# Patient Record
Sex: FEMALE | Race: WHITE | URBAN - METROPOLITAN AREA
[De-identification: names, ages, dates, MRNs, and addresses within clinical notes are randomized per-mention and may not be internally consistent; named-entity substitution may affect disease eponyms.]

---

## 2020-12-28 ENCOUNTER — OFFICE VISIT (OUTPATIENT)
Dept: ENT CLINIC | Age: 25
End: 2020-12-28
Payer: COMMERCIAL

## 2020-12-28 VITALS — TEMPERATURE: 96.9 F

## 2020-12-28 DIAGNOSIS — G43.809 OTHER MIGRAINE WITHOUT STATUS MIGRAINOSUS, NOT INTRACTABLE: ICD-10-CM

## 2020-12-28 DIAGNOSIS — J32.8 OTHER CHRONIC SINUSITIS: ICD-10-CM

## 2020-12-28 DIAGNOSIS — J31.2 CHRONIC PHARYNGITIS: Primary | ICD-10-CM

## 2020-12-28 PROCEDURE — 99203 OFFICE O/P NEW LOW 30 MIN: CPT | Performed by: OTOLARYNGOLOGY

## 2020-12-28 RX ORDER — AMOXICILLIN AND CLAVULANATE POTASSIUM 875; 125 MG/1; MG/1
1 TABLET, FILM COATED ORAL 2 TIMES DAILY
Qty: 20 TAB | Refills: 0 | Status: SHIPPED | OUTPATIENT
Start: 2020-12-28 | End: 2021-01-07

## 2020-12-28 NOTE — PROGRESS NOTES
Subjective:    Ina Drafts   22 y.o.   1995     HPI     Location - throat, glands, head    Quality - frequent sore throat, swollen glands, migraines    Severity -  moderate    Duration - about 3 months    Timing - frequent    Context - pt states urgent care visits for sore throat and swollen glands, tends to go away after a few days with conservative treatment; she was told it is not strep and just takes OTC meds; she also has had increase in migraines this year, was wondering if any food sensitivity    Modifying Features - none    Associated symptoms/signs - migraines      Review of Systems  Review of Systems   Constitutional: Negative for chills and fever. HENT: Positive for congestion and sore throat. Negative for ear pain, hearing loss, nosebleeds and tinnitus. Eyes: Negative for blurred vision and double vision. Respiratory: Negative for cough, sputum production and shortness of breath. Cardiovascular: Negative for chest pain and palpitations. Gastrointestinal: Negative for heartburn, nausea and vomiting. Musculoskeletal: Negative for joint pain and neck pain. Skin: Negative. Neurological: Positive for headaches. Negative for dizziness, speech change and weakness. Endo/Heme/Allergies: Negative for environmental allergies. Does not bruise/bleed easily. Psychiatric/Behavioral: Negative for memory loss. The patient does not have insomnia. History reviewed. No pertinent past medical history.   Past Surgical History:   Procedure Laterality Date    HX WISDOM TEETH EXTRACTION  2013      Family History   Problem Relation Age of Onset    No Known Problems Mother     No Known Problems Father     No Known Problems Sister     No Known Problems Brother     Diabetes Maternal Grandfather      Social History     Tobacco Use    Smoking status: Never Smoker    Smokeless tobacco: Never Used   Substance Use Topics    Alcohol use: Yes     Comment: occassionally      Prior to Admission medications    Medication Sig Start Date End Date Taking? Authorizing Provider   amoxicillin-clavulanate (AUGMENTIN) 875-125 mg per tablet Take 1 Tab by mouth two (2) times a day for 10 days. 12/28/20 1/7/21 Yes Esha Cordon MD        No Known Allergies      Objective:     Visit Vitals  Temp 96.9 °F (36.1 °C)        Physical Exam  Vitals signs reviewed. Constitutional:       General: She is awake. She is not in acute distress. Appearance: Normal appearance. She is well-groomed and normal weight. HENT:      Head: Normocephalic and atraumatic. Jaw: There is normal jaw occlusion. No trismus, tenderness or malocclusion. Salivary Glands: Right salivary gland is not diffusely enlarged or tender. Left salivary gland is not diffusely enlarged or tender. Right Ear: Hearing, ear canal and external ear normal. Tympanic membrane is scarred. Left Ear: Hearing, ear canal and external ear normal. Tympanic membrane is scarred. Ears:      Joyce exam findings: does not lateralize. Right Rinne: AC > BC. Left Rinne: AC > BC. Nose: Mucosal edema present. No nasal deformity or septal deviation. Right Nostril: No epistaxis. Left Nostril: No epistaxis. Right Turbinates: Enlarged. Not swollen or pale. Left Turbinates: Enlarged. Not swollen or pale. Right Sinus: No maxillary sinus tenderness or frontal sinus tenderness. Left Sinus: No maxillary sinus tenderness or frontal sinus tenderness. Mouth/Throat:      Lips: Pink. No lesions. Mouth: Mucous membranes are moist. No oral lesions. Dentition: Normal dentition. No dental caries. Tongue: No lesions. Palate: No mass and lesions. Pharynx: Oropharynx is clear. Uvula midline. No oropharyngeal exudate or posterior oropharyngeal erythema. Tonsils: No tonsillar exudate. 1+ on the right. 1+ on the left.       Comments: Purulent PND left posterior pharynx  Eyes:      General: Vision grossly intact. Extraocular Movements: Extraocular movements intact. Right eye: No nystagmus. Left eye: No nystagmus. Conjunctiva/sclera: Conjunctivae normal.      Pupils: Pupils are equal, round, and reactive to light. Neck:      Musculoskeletal: No edema or erythema. Thyroid: No thyroid mass, thyromegaly or thyroid tenderness. Trachea: Trachea and phonation normal. No tracheal tenderness or tracheal deviation. Cardiovascular:      Rate and Rhythm: Normal rate and regular rhythm. Pulmonary:      Effort: Pulmonary effort is normal. No respiratory distress. Breath sounds: No stridor. Musculoskeletal: Normal range of motion. General: No swelling or tenderness. Lymphadenopathy:      Cervical: No cervical adenopathy. Skin:     General: Skin is warm and dry. Findings: No lesion or rash. Neurological:      General: No focal deficit present. Mental Status: She is alert and oriented to person, place, and time. Mental status is at baseline. Cranial Nerves: Cranial nerves are intact. Coordination: Romberg sign negative. Gait: Gait is intact. Comments: Negative Hallpike   Psychiatric:         Mood and Affect: Mood normal.         Behavior: Behavior normal. Behavior is cooperative. Assessment/Plan:     Encounter Diagnoses   Name Primary?  Chronic pharyngitis Yes    Other chronic sinusitis     Other migraine without status migrainosus, not intractable      With her visible purulent PND and sore throat, headaches I suspect she has an untreated chronic sinus picture  Will do 10 days augmentin and f/u 4 weeks  Consider allergy testing    Orders Placed This Encounter    amoxicillin-clavulanate (AUGMENTIN) 875-125 mg per tablet     Follow-up and Dispositions    · Return in about 4 weeks (around 1/25/2021).

## 2021-01-26 ENCOUNTER — TELEPHONE (OUTPATIENT)
Dept: ENT CLINIC | Age: 26
End: 2021-01-26

## 2021-02-11 ENCOUNTER — OFFICE VISIT (OUTPATIENT)
Dept: FAMILY MEDICINE CLINIC | Age: 26
End: 2021-02-11
Payer: COMMERCIAL

## 2021-02-11 VITALS
SYSTOLIC BLOOD PRESSURE: 110 MMHG | HEART RATE: 99 BPM | DIASTOLIC BLOOD PRESSURE: 62 MMHG | OXYGEN SATURATION: 98 % | WEIGHT: 134.25 LBS | TEMPERATURE: 98 F | BODY MASS INDEX: 24.7 KG/M2 | HEIGHT: 62 IN

## 2021-02-11 DIAGNOSIS — R51.9 INTRACTABLE EPISODIC HEADACHE, UNSPECIFIED HEADACHE TYPE: ICD-10-CM

## 2021-02-11 DIAGNOSIS — E16.2 HYPOGLYCEMIA: Primary | ICD-10-CM

## 2021-02-11 PROBLEM — N39.9 UROLOGIC DISORDER: Status: RESOLVED | Noted: 2021-02-11 | Resolved: 2021-02-11

## 2021-02-11 PROBLEM — R31.9 HEMATURIA: Status: ACTIVE | Noted: 2021-02-11

## 2021-02-11 PROBLEM — N39.9 UROLOGIC DISORDER: Status: ACTIVE | Noted: 2021-02-11

## 2021-02-11 PROCEDURE — 99213 OFFICE O/P EST LOW 20 MIN: CPT | Performed by: NURSE PRACTITIONER

## 2021-02-11 NOTE — PROGRESS NOTES
Subjective  Chief Complaint   Patient presents with    Headache     x 6 months     HPI:  Onesimo Díaz is a 22 y.o. female. Patient presents with complaint of headaches for the past several months. Headaches have occurred 2-3 times since December. Headaches occur when hungry and are accompanied by dizziness, nausea, shaking, and weakness. Headaches only occur when she is working and hungry.   Evaluation to date: none  Treatment to date: resolves with food and Goodies powder   Relevant PMH: Diagnosed with migraines as a teen, treated with RX prn meds    Past Medical History:   Diagnosis Date    Anxiety     Recurrent urinary tract infection      Family History   Problem Relation Age of Onset    No Known Problems Mother     No Known Problems Father     No Known Problems Sister     No Known Problems Brother     Diabetes Maternal Grandfather      Social History     Socioeconomic History    Marital status:      Spouse name: Not on file    Number of children: Not on file    Years of education: Not on file    Highest education level: Not on file   Occupational History    Not on file   Social Needs    Financial resource strain: Not on file    Food insecurity     Worry: Not on file     Inability: Not on file    Transportation needs     Medical: Not on file     Non-medical: Not on file   Tobacco Use    Smoking status: Never Smoker    Smokeless tobacco: Never Used   Substance and Sexual Activity    Alcohol use: Yes     Comment: occassionally    Drug use: Never    Sexual activity: Not on file   Lifestyle    Physical activity     Days per week: Not on file     Minutes per session: Not on file    Stress: Not on file   Relationships    Social connections     Talks on phone: Not on file     Gets together: Not on file     Attends Shinto service: Not on file     Active member of club or organization: Not on file     Attends meetings of clubs or organizations: Not on file     Relationship status: Not on file    Intimate partner violence     Fear of current or ex partner: Not on file     Emotionally abused: Not on file     Physically abused: Not on file     Forced sexual activity: Not on file   Other Topics Concern    Not on file   Social History Narrative    Not on file     No current outpatient medications on file prior to visit. No current facility-administered medications on file prior to visit. No Known Allergies  ROS  See HPI for pertinent ROS. Objective  Physical Exam  Vitals signs and nursing note reviewed. Constitutional:       General: She is not in acute distress. Appearance: Normal appearance. She is normal weight. HENT:      Head: Normocephalic. Eyes:      Extraocular Movements: Extraocular movements intact. Pupils: Pupils are equal, round, and reactive to light. Cardiovascular:      Rate and Rhythm: Normal rate and regular rhythm. Heart sounds: Normal heart sounds. Pulmonary:      Effort: Pulmonary effort is normal.      Breath sounds: Normal breath sounds. Musculoskeletal: Normal range of motion. Right lower leg: No edema. Left lower leg: No edema. Skin:     General: Skin is warm and dry. Neurological:      Mental Status: She is alert and oriented to person, place, and time. Cranial Nerves: Cranial nerves are intact. Sensory: Sensation is intact. Motor: Motor function is intact. Coordination: Coordination is intact. Gait: Gait is intact. Psychiatric:         Mood and Affect: Mood normal.         Behavior: Behavior normal.          Assessment & Plan      ICD-10-CM ICD-9-CM    1. Hypoglycemia  E16.2 251.2    2. Intractable episodic headache, unspecified headache type  R51.9 784.0      Diagnoses and all orders for this visit:    1. Hypoglycemia  Diagnosis of hypoglycemia is made on history, there is no specific test to diagnose.  Eat 6 smaller meals versus 3 large meals throughout the day to maintain steady glucose levels. Eat mostly proteins. Carry snack (ie. peanut butter crackers) at all times to eat during hypoglycemic episodes. 2. Intractable episodic headache, unspecified headache type  Neuro exam is unremarkable in the office today. Likely due to hunger/hypoglycemia. Return to the office for increasing, worsening headaches especially if not accompanied by hunger.            Mackenzie Posadas NP

## 2021-02-11 NOTE — PATIENT INSTRUCTIONS
Hypoglycemia: Care Instructions Your Care Instructions Hypoglycemia means that your blood sugar is low and your body is not getting enough fuel. Some people get low blood sugar from not eating often enough. Some medicines to treat diabetes can cause low blood sugar. People who have had surgery on their stomachs or intestines may get hypoglycemia. Problems with the pancreas, kidneys, or liver also can cause low blood sugar. A snack or drink with sugar in it will raise your blood sugar and should ease your symptoms right away. Your doctor may recommend that you change or stop your medicines until you can get your blood sugar levels under control. In the long run, you may need to change your diet and eating habits so that you get enough fuel for your body throughout the day. Follow-up care is a key part of your treatment and safety. Be sure to make and go to all appointments, and call your doctor if you are having problems. It's also a good idea to know your test results and keep a list of the medicines you take. How can you care for yourself at home? · Know the early signs of low blood sugar. Signs include: 
? Nausea. ? Hunger. ? Feeling nervous, irritable, or shaky. ? Cold, clammy skin. ? Sweating (when you're not exercising). ? A fast heartbeat. 
? Numbness or tingling in fingertips or lips. · If you have early signs of low blood sugar, eat or drink a quick-sugar food. Examples are glucose tablets, table sugar, hard candy (such as Life Savers), fruit juice, and regular (not diet) soda. · Eat small, frequent meals so you don't get too hungry between meals. · Balance extra exercise with eating more. · Keep a written record of your low blood sugar episodes, including what and when you last ate. This helps you know what causes your blood sugar to drop. · Make sure family, friends, and coworkers know the symptoms of low blood sugar and know how to get your sugar level up. · Wear medical alert jewelry that lists your condition. You can buy this at most drugstores. When should you call for help? Call 911 anytime you think you may need emergency care. For example, call if: 
  · You passed out (lost consciousness).  
  · You are confused or cannot think clearly.  
  · Your blood sugar is very high or very low. Watch closely for changes in your health, and be sure to contact your doctor if: 
  · Your blood sugar stays outside the level your doctor set for you.  
  · You have any problems. Where can you learn more? Go to http://www.gray.com/ Enter R860 in the search box to learn more about \"Hypoglycemia: Care Instructions. \" Current as of: December 20, 2019               Content Version: 12.6 © 6477-8727 [employer]. Care instructions adapted under license by [employer] (which disclaims liability or warranty for this information). If you have questions about a medical condition or this instruction, always ask your healthcare professional. [address] any warranty or liability for your use of this information.

## 2021-07-28 ENCOUNTER — OFFICE VISIT (OUTPATIENT)
Dept: FAMILY MEDICINE CLINIC | Age: 26
End: 2021-07-28
Payer: COMMERCIAL

## 2021-07-28 VITALS
SYSTOLIC BLOOD PRESSURE: 100 MMHG | TEMPERATURE: 97.5 F | OXYGEN SATURATION: 98 % | HEIGHT: 62 IN | DIASTOLIC BLOOD PRESSURE: 84 MMHG | RESPIRATION RATE: 16 BRPM | HEART RATE: 108 BPM | BODY MASS INDEX: 25.21 KG/M2 | WEIGHT: 137 LBS

## 2021-07-28 DIAGNOSIS — R19.7 DIARRHEA OF PRESUMED INFECTIOUS ORIGIN: ICD-10-CM

## 2021-07-28 DIAGNOSIS — R00.0 TACHYCARDIA: ICD-10-CM

## 2021-07-28 DIAGNOSIS — R09.81 NASAL CONGESTION: Primary | ICD-10-CM

## 2021-07-28 DIAGNOSIS — J06.9 VIRAL UPPER RESPIRATORY TRACT INFECTION: ICD-10-CM

## 2021-07-28 DIAGNOSIS — R11.2 INTRACTABLE VOMITING WITH NAUSEA, UNSPECIFIED VOMITING TYPE: ICD-10-CM

## 2021-07-28 PROCEDURE — 99214 OFFICE O/P EST MOD 30 MIN: CPT | Performed by: NURSE PRACTITIONER

## 2021-07-28 NOTE — PROGRESS NOTES
Subjective  Chief Complaint   Patient presents with    Other     headache, vomitting, diarreah, sore throat, right ear hurts over a week, went to [facility] on saturday     HPI:  Emelina Wright is a 32 y.o. female. Patient presents with complaint of nasal congestion, headache,runny nose, sneezing, ear pressure/pain, sore throat, mild cough for the past week. Diarrhea and vomiting for the past 3 days. Vomiting is improving. Diarrhea occurs quickly after eating or drinking. Denies fever, chills, and body aches. Denies SOB, CP, palpitations, and wheezing. Her  had a mild respiratory illness with diarrhea the week prior to her symptoms, not COVID tested. 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 Evaluation to date: [facility] 7/25 and diagnosed with UTI, prescribed Cipro and advised to stop for negative culture. Treatment to date: Ibuprofen, Muxinex- both offer temporary improvement  Relevant PMH: No pertinent additional PMH.     Past Medical History:   Diagnosis Date    Anxiety     Recurrent urinary tract infection      Family History   Problem Relation Age of Onset    No Known Problems Mother     No Known Problems Father     No Known Problems Sister     No Known Problems Brother     Diabetes Maternal Grandfather      Social History     Socioeconomic History    Marital status:      Spouse name: Not on file    Number of children: Not on file    Years of education: Not on file    Highest education level: Not on file   Occupational History    Not on file   Tobacco Use    Smoking status: Never Smoker    Smokeless tobacco: Never Used   Vaping Use    Vaping Use: Never used   Substance and Sexual Activity    Alcohol use: Yes     Comment: occassionally    Drug use: Never    Sexual activity: Not on file   Other Topics Concern    Not on file   Social History Narrative    Not on file     Social Determinants of Health     Financial Resource Strain:     Difficulty of Paying Living Expenses:    Food Insecurity:     Worried About Running Out of Food in the Last Year:     [address] in the Last Year:    Transportation Needs:     Lack of Transportation (Medical):  Lack of Transportation (Non-Medical):    Physical Activity:     Days of Exercise per Week:     Minutes of Exercise per Session:    Stress:     Feeling of Stress :    Social Connections:     Frequency of Communication with Friends and Family:     Frequency of Social Gatherings with Friends and Family:     Attends Mormon Services:     Active Member of Clubs or Organizations:     Attends Club or Organization Meetings:     Marital Status:    Intimate Partner Violence:     Fear of Current or Ex-Partner:     Emotionally Abused:     Physically Abused:     Sexually Abused:      No current outpatient medications on file prior to visit. No current facility-administered medications on file prior to visit. No Known Allergies  ROS  See HPI for pertinent ROS. Objective  Visit Vitals  /84 (BP 1 Location: Left upper arm, BP Patient Position: Sitting)   Pulse (!) 108   Temp 97.5 °F (36.4 °C) (Temporal)   Resp 16   Ht 5' 2\" (1.575 m)   Wt 137 lb (62.1 kg)   SpO2 98%   BMI 25.06 kg/m²       Physical Exam  Vitals and nursing note reviewed. Constitutional:       General: She is not in acute distress. Appearance: Normal appearance. HENT:      Head: Normocephalic. Eyes:      Extraocular Movements: Extraocular movements intact. Cardiovascular:      Rate and Rhythm: Normal rate and regular rhythm. Heart sounds: Normal heart sounds. Pulmonary:      Effort: Pulmonary effort is normal.      Breath sounds: Normal breath sounds. Musculoskeletal:         General: Normal range of motion. Right lower leg: No edema. Left lower leg: No edema. Skin:     General: Skin is warm and dry. Neurological:      Mental Status: She is alert and oriented to person, place, and time.    Psychiatric: Mood and Affect: Mood normal.         Behavior: Behavior normal.          Assessment & Plan      ICD-10-CM ICD-9-CM    1. Nasal congestion  R09.81 478.19 NOVEL CORONAVIRUS (COVID-19)   2. Viral upper respiratory tract infection  J06.9 465.9    3. Diarrhea of presumed infectious origin  R19.7 009.3    4. Intractable vomiting with nausea, unspecified vomiting type  R11.2 536.2    5. Tachycardia  R00.0 785.0      Diagnoses and all orders for this visit:    1. Nasal congestion  Symptoms are suspicious for COVID infection. Advised to quarantine until results are received and verbalized understanding. Once results are received we will provide additional instruction. Antibiotics are not necessary for treatment of viral infection. Recommend rest, high fluid intake, Tylenol/Motrin prn, and OTC cough and cold meds for symptom management.   -     NOVEL CORONAVIRUS (COVID-19)    2. Viral upper respiratory tract infection  As above. 3. Diarrhea of presumed infectious origin  Typically viral and can persist for 10-14 days. Good handwashing and wipe down bathroom surfaces with wipes frequently. Finley foods, advance as tolerated to regular diet. Increase fluid intake as tolerated to prevent dehydration. 4. Intractable vomiting with nausea, unspecified vomiting type  Now resolved. 5. Tachycardia  Heart rate WNL when auscultated. Follow-up and Dispositions    · Return if symptoms worsen or fail to improve.            Lori Ignacio NP

## 2021-07-28 NOTE — LETTER
NOTIFICATION RETURN TO WORK / SCHOOL    7/28/2021 1:14 PM    Ms. Mervat Diaz   [address] [address] [phone]      To Whom It May Concern:    Galo Barry is currently under the care of [address] [place] [address]. She will return to work/school on: to be determined based on COVID test results    If there are questions or concerns please have the patient contact our office.         Sincerely,      Karishma Garcia NP

## 2021-07-28 NOTE — PROGRESS NOTES
Chief Complaint   Patient presents with    Other     headache, vomitting, diarreah, sore throat, right ear hurts over a week, went to [facility] on saturday   1. Have you been to the ER, urgent care clinic since your last visit? Hospitalized since your last visit? Yes When: [facility], 07/25/2021, sick visit    2. Have you seen or consulted any other health care providers outside of the [address] [place] [address] since your last visit? Include any pap smears or colon screening.  No

## 2021-07-30 LAB
SARS-COV-2, NAA 2 DAY TAT: NORMAL
SARS-COV-2, NAA: NOT DETECTED

## 2021-07-30 NOTE — PROGRESS NOTES
Patient advised. Patient states she is feeling better. States she had a little diarreah this morning but better.

## 2022-01-28 ENCOUNTER — TELEPHONE (OUTPATIENT)
Dept: FAMILY MEDICINE CLINIC | Age: 27
End: 2022-01-28

## 2022-01-28 ENCOUNTER — NURSE TRIAGE (OUTPATIENT)
Dept: OTHER | Facility: CLINIC | Age: 27
End: 2022-01-28

## 2022-01-28 NOTE — TELEPHONE ENCOUNTER
Please see triage message. If she can drop a urine specimen before noon, I will see her virtually this afternoon as a work in.

## 2022-01-28 NOTE — TELEPHONE ENCOUNTER
She is coming in Monday morning to see [facility] and will seek UC if gets worse.

## 2022-01-28 NOTE — TELEPHONE ENCOUNTER
Can she be scheduled for Monday? Please advise her to seek care at [facility] this weekend if symptoms are intolerable or worsen.

## 2022-01-28 NOTE — TELEPHONE ENCOUNTER
Received call from [place] at [facility] with Red Flag Complaint. Subjective: Caller states \" All week I have been experiencing back pain. The pain worsened last night, moreso on my right side. I also have been having dizziness of and on this week. I have a hx of UTIs and the back pain that I am experiencing feels similar to when I have a UTI. \" Pt denies fever, chills, CP, SOB, or urinary symptoms. PMH: chronic UTIs  Current Symptoms: back pain, dizzinesss    Onset: 1 week ago; intermittent    Associated Symptoms: NA    Pain Severity: 4/10; aching; constant    Temperature: denies     What has been tried: tylenol, nyquil, heating pad    LMP: 1/21/22 Pregnant: No    Recommended disposition: See in office within 3 days. Pt advised to go to [address] [place] [address] if no available appointments. Care advice provided, patient verbalizes understanding; denies any other questions or concerns; instructed to call back for any new or worsening symptoms. Writer provided warm transfer to [address] [place] [address] at [facility] for appointment scheduling    Attention Provider: Thank you for allowing me to participate in the care of your patient. The patient was connected to triage in response to information provided to the [facility]. Please do not respond through this encounter as the response is not directed to a shared pool.       Reason for Disposition   MODERATE back pain (e.g., interferes with normal activities) and present > 3 days    Protocols used: BACK PAIN-ADULT-[place]